# Patient Record
Sex: MALE | Race: OTHER | URBAN - METROPOLITAN AREA
[De-identification: names, ages, dates, MRNs, and addresses within clinical notes are randomized per-mention and may not be internally consistent; named-entity substitution may affect disease eponyms.]

---

## 2020-08-05 ENCOUNTER — EMERGENCY (EMERGENCY)
Facility: HOSPITAL | Age: 35
LOS: 1 days | Discharge: HOME | End: 2020-08-05
Attending: EMERGENCY MEDICINE | Admitting: EMERGENCY MEDICINE
Payer: COMMERCIAL

## 2020-08-05 VITALS
WEIGHT: 210.1 LBS | DIASTOLIC BLOOD PRESSURE: 97 MMHG | TEMPERATURE: 99 F | RESPIRATION RATE: 18 BRPM | HEART RATE: 66 BPM | SYSTOLIC BLOOD PRESSURE: 151 MMHG | OXYGEN SATURATION: 98 %
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
## 2020-08-05 DIAGNOSIS — S51.822A LACERATION WITH FOREIGN BODY OF LEFT FOREARM, INITIAL ENCOUNTER: ICD-10-CM
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
## 2020-08-05 DIAGNOSIS — Z23 ENCOUNTER FOR IMMUNIZATION: ICD-10-CM
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
## 2020-08-05 PROCEDURE — 73090 X-RAY EXAM OF FOREARM: CPT | Mod: 26,LT

## 2020-08-05 PROCEDURE — 99283 EMERGENCY DEPT VISIT LOW MDM: CPT | Mod: 25

## 2020-08-05 PROCEDURE — 90715 TDAP VACCINE 7 YRS/> IM: CPT

## 2020-08-05 PROCEDURE — 99282 EMERGENCY DEPT VISIT SF MDM: CPT

## 2020-08-05 PROCEDURE — 12031 INTMD RPR S/A/T/EXT 2.5 CM/<: CPT

## 2020-08-05 PROCEDURE — 73090 X-RAY EXAM OF FOREARM: CPT

## 2020-08-05 PROCEDURE — 90471 IMMUNIZATION ADMIN: CPT

## 2020-08-05 RX ORDER — CEPHALEXIN 500 MG
1 CAPSULE ORAL
Qty: 21 | Refills: 0
Start: 2020-08-05 | End: 2020-08-11

## 2020-08-05 RX ORDER — TETANUS TOXOID, REDUCED DIPHTHERIA TOXOID AND ACELLULAR PERTUSSIS VACCINE, ADSORBED 5; 2.5; 8; 8; 2.5 [IU]/.5ML; [IU]/.5ML; UG/.5ML; UG/.5ML; UG/.5ML
0.5 SUSPENSION INTRAMUSCULAR ONCE
Refills: 0 | Status: COMPLETED | OUTPATIENT
Start: 2020-08-05 | End: 2020-08-05

## 2020-08-05 RX ADMIN — TETANUS TOXOID, REDUCED DIPHTHERIA TOXOID AND ACELLULAR PERTUSSIS VACCINE, ADSORBED 0.5 MILLILITER(S): 5; 2.5; 8; 8; 2.5 SUSPENSION INTRAMUSCULAR at 09:23

## 2020-08-05 NOTE — ED PROVIDER NOTE - OBJECTIVE STATEMENT
34 y/o M w/ no significant PMHx presents to the ED c/o laceration to L forearm sustained 1 hr pta while at work. Pt reports was hammering steel and piece flew and cut his forearm, initially blood shooting out. Upon arrival bleeding controlled by pressure dressing. Denies numbness/tingling, fever, chills, weakness to extremities or any other sx. Unsure last tetanus. 36 y/o M w/ no significant PMHx presents to the ED c/o laceration to L forearm sustained 1 hr pta while at work. Pt reports was hammering steel and piece flew and cut his forearm, initially blood shooting out. Upon arrival bleeding controlled by pressure dressing. Denies numbness/tingling, weakness to injured extremity or any other sx. Unsure last tetanus.

## 2020-08-05 NOTE — ED PROVIDER NOTE - ATTENDING CONTRIBUTION TO CARE
34 y/o M w/ no significant PMHx presents to the ED c/o laceration to L forearm sustained 1 hr pta while at work. Pt reports was hammering steel and a piece flew and cut his forearm with bleeding. Upon arrival bleeding controlled by pressure dressing. Denies numbness/tingling, weakness to injured extremity or any other sx. Unsure of last tetanus. Pt AAO, NAD, laceration noted to left forearm. foreign body noted on the xray of  left forearm, foreign body is deeper and not visualized by exploration of laceration.  Ortho hand on call consulted re: foreign body. 36 y/o M w/ no significant PMHx presents to the ED c/o laceration to L forearm sustained 1 hr pta while at work. Pt reports was hammering steel and a piece flew and cut his forearm with bleeding. Upon arrival bleeding controlled by pressure dressing. Denies numbness/tingling, weakness to injured extremity or any other sx. Unsure of last tetanus. Pt AAO, NAD, laceration noted to left forearm. foreign body noted on the xray of left forearm, foreign body is deeper and not visualized by exploration of laceration.  Ortho/ hand on call consulted re: foreign body. Per Ortho, pt may follow up outpt for FB removal. Lac sutured by JOLENE Hutchison- see procedure note, Pt stable for dc and outpt f/up.

## 2020-08-05 NOTE — ED PROVIDER NOTE - NSFOLLOWUPINSTRUCTIONS_ED_ALL_ED_FT
Sutured Wound Care  Sutures are stitches that can be used to close wounds. Taking care of your wound properly can help to prevent pain and infection. It can also help your wound to heal more quickly. Follow instructions from your health care provider about how to care for your sutured wound.  Supplies needed:  Soap and water.A clean bandage (dressing), if needed.Antibiotic ointment.A clean towel.How to care for your sutured wound     Keep the wound completely dry for the first 24 hours, or for as long as directed by your health care provider. After 24–48 hours, you may shower or bathe as directed by your health care provider. Do not soak or submerge the wound in water until the sutures have been removed.After the first 24 hours, clean the wound once a day, or as often as directed by your health care provider, using the following steps:  Wash the wound with soap and water.Rinse the wound with water to remove all soap.Pat the wound dry with a clean towel. Do not rub the wound.After cleaning the wound, apply a thin layer of antibiotic ointment as directed by your health care provider. This will prevent infection and keep the dressing from sticking to the wound.Follow instructions from your health care provider about how to change your dressing:  Wash your hands with soap and water. If soap and water are not available, use hand .Change your dressing at least once a day, or as often as told by your health care provider. If your dressing gets wet or dirty, change it.Leave sutures and other skin closures, such as adhesive tape or skin glue, in place. These skin closures may need to stay in place for 2 weeks or longer. If adhesive strip edges start to loosen and curl up, you may trim the loose edges. Do not remove adhesive strips completely unless your health care provider tells you to do that.Check your wound every day for signs of infection. Watch for:  Redness, swelling, or pain.Fluid or blood.Warmth.Pus or a bad smell.Have the sutures removed as directed by your health care provider.Follow these instructions at home:  Medicines     Take or apply over-the-counter and prescription medicines only as told by your health care provider.If you were prescribed an antibiotic medicine or ointment, take or apply it as told by your health care provider. Do not stop using the antibiotic even if your condition improves.General instructions     To help reduce scarring after your wound heals, cover your wound with clothing or apply sunscreen of at least 30 SPF whenever you are outside.Do not scratch or pick at your wound.Avoid stretching your wound.Raise (elevate) the injured area above the level of your heart while you are sitting or lying down, if possible.Drink enough fluids to keep your urine clear or pale yellow.Keep all follow-up visits as told by your health care provider. This is important.Contact a health care provider if:  You received a tetanus shot and you have swelling, severe pain, redness, or bleeding at the injection site.Your wound breaks open.You have redness, swelling, or pain around your wound.You have fluid or blood coming from your wound.Your wound feels warm to the touch.You have a fever.You notice something coming out of your wound, such as wood or glass.You have pain that does not get better with medicine.The skin near your wound changes color.You need to change your dressing very frequently due to a lot of fluid, blood, or pus draining from the wound.You develop a new rash.You develop numbness around the wound.Get help right away if:  You develop severe swelling around your wound.You have pus or a bad smell coming from your wound.Your pain suddenly gets worse and is severe.You develop painful lumps near your wound or anywhere on your body.You have a red streak going away from your wound.The wound is on your hand or foot and:  You cannot properly move a finger or toe.Your fingers or toes look pale or bluish.You have numbness that is spreading down your hand, foot, fingers, or toes.Summary  Sutures are stitches that can be used to close wounds.Taking care of your wound properly can help to prevent pain and infection.Keep the wound completely dry for the first 24 hours, or for as long as directed by your health care provider. After 24–48 hours, you may shower or bathe as directed by your health care provider.This information is not intended to replace advice given to you by your health care provider. Make sure you discuss any questions you have with your health care provider.    F/u for suture removal in 7-10 days. Keep your wound clean, dry., apply topical antibiotics as recommended.

## 2020-08-05 NOTE — ED PROVIDER NOTE - CONSTITUTIONAL, MLM
normal... Well appearing, awake, alert, oriented to person, place, time/situation and in no apparent distress. Well appearing, and in no apparent distress.

## 2020-08-05 NOTE — ED PROVIDER NOTE - NEUROLOGICAL, MLM
Alert and oriented, no focal deficits, no motor or sensory deficits, 5/5 strength to upper extremities, normal sensation

## 2020-08-05 NOTE — ED PROVIDER NOTE - PATIENT PORTAL LINK FT
You can access the FollowMyHealth Patient Portal offered by [facility] by registering at the following website: [email or web address]. By joining [employer]’s FollowMyHealth portal, you will also be able to view your health information using other applications (apps) compatible with our system.

## 2020-08-05 NOTE — ED PROVIDER NOTE - PROGRESS NOTE DETAILS
foreign body noted on the xray of  left forearm , foreign body are deeper and far from  laceration.  Ortho hand on call consulted re: foreign body. pt evaluated by ortho team, d/c home recommended and f/u as out pt for foreign body removal PRN.

## 2020-08-05 NOTE — ED PROVIDER NOTE - MUSCULOSKELETAL, MLM
Spine appears normal, range of motion is not limited, full ROM to L elbow, wrist and fingers, good pulses Spine appears normal, range of motion is not limited, full ROM to L elbow, L wrist and fingers, good pulses

## 2020-08-05 NOTE — ED PROVIDER NOTE - CLINICAL SUMMARY MEDICAL DECISION MAKING FREE TEXT BOX
36 y/o M presents to the ED c/o superficial laceration accidentally sustained while at work. Will check xray to make sure no FB present, will irrigate and close w/ sutures. Will update tetanus. Wound care explained to the pt. 36 y/o M presents to the ED c/o  laceration accidentally sustained while at work. plan : 1) check xray to make sure no FB present, 2) suture to close the wound, 3) tetanus booster, 4) wound care.

## 2020-08-05 NOTE — ED PROVIDER NOTE - SKIN, MLM
Skin normal color for race, warm, dry, no evidence of rash, 1 cm superficial laceration to L forearm w/ no active bleeding on arrival

## 2020-08-05 NOTE — ED PROVIDER NOTE - CARE PROVIDER_API CALL
Shahid Rogers  PLASTIC SURGERY  [address] [place] [address]  Phone: [phone]  Fax: [phone]  Follow Up Time:     Dave Moctezuma  [facility]  [address] [place] [address]  Phone: [phone]  Fax: [phone]  Follow Up Time:

## 2020-08-05 NOTE — CONSULT NOTE ADULT - SUBJECTIVE AND OBJECTIVE BOX
Orthopaedic Surgery Consult Note    For Surgeon: Dr. Valerio     HPI:  The patient is a 35M with no significant past medical history presenting with a laceration to his left forearm s/p injury at work. The patient states that he was hammering steel and that a piece flew in the hair and hit him in the forearm. States he has minimal pain in area overlying the laceration. Denies numbness/tingling of his upper extremities, weakness, any other symptoms.       Allergies:  No Known Allergies    PAST MEDICAL & SURGICAL HISTORY:  Denies     Social History:  Social etoh drinker  no cigarette smoking     Vital Signs Last 24 Hrs  T(C): 37.1 (05 Aug 2020 08:30), Max: 37.1 (05 Aug 2020 08:30)  T(F): 98.7 (05 Aug 2020 08:30), Max: 98.7 (05 Aug 2020 08:30)  HR: 66 (05 Aug 2020 08:30) (66 - 66)  BP: 151/97 (05 Aug 2020 08:30) (151/97 - 151/97)  BP(mean): --  RR: 18 (05 Aug 2020 08:30) (18 - 18)  SpO2: 98% (05 Aug 2020 08:30) (98% - 98%)    Physical Exam:  Gen: NAD, sitting comfortably on chair in the ED   MSK: Skin well perfused. Superficial 1cm laceration noted to palmar aspect of left forearm. No active bleeding/drainage. Radial pulses palpable bilateral upper extremities. Sensation intact to bilateral upper extremities.  strength/biceps/triceps strength 5/5 bilateral upper extremities.     Imaging:   Left forearm xray:  no actue fracture/dislocation. foreign body noted in soft tissue.      A/P: 35yMale with superficial left arm laceration, with foreign body noted on xray  -Discussed with Dr. Valerio  -Per Dr. Valerio, would recommend tetanus shot, antibiotics, irrigation/wound closure and dress with xeroform dressing  -Patient instructed to follow up with Dr. Valerio as an outpatient for removal of foreign body on an elective basis  -Patient understands and agrees with plan  - pain control    Naz Mcnamara PA-C

## 2020-08-05 NOTE — ED PROVIDER NOTE - CARE PROVIDERS DIRECT ADDRESSES
,DirectAddress_Unknown,[email or web address]

## 2020-08-05 NOTE — ED ADULT TRIAGE NOTE - CHIEF COMPLAINT QUOTE
Patient came for left arm laceration from the metal steel on his truck . Tetanus shot not up to date .

## 2020-08-06 PROBLEM — Z00.00 ENCOUNTER FOR PREVENTIVE HEALTH EXAMINATION: Status: ACTIVE | Noted: 2020-08-06

## 2020-08-13 ENCOUNTER — APPOINTMENT (OUTPATIENT)
Dept: ORTHOPEDIC SURGERY | Facility: CLINIC | Age: 35
End: 2020-08-13
Payer: COMMERCIAL

## 2020-08-13 VITALS — WEIGHT: 210 LBS | HEIGHT: 73 IN | RESPIRATION RATE: 16 BRPM | BODY MASS INDEX: 27.83 KG/M2

## 2020-08-13 DIAGNOSIS — Z78.9 OTHER SPECIFIED HEALTH STATUS: ICD-10-CM

## 2020-08-13 DIAGNOSIS — S50.852A SUPERFICIAL FOREIGN BODY OF LEFT FOREARM, INITIAL ENCOUNTER: ICD-10-CM

## 2020-08-13 DIAGNOSIS — S51.812A LACERATION W/OUT FOREIGN BODY OF LEFT FOREARM, INITIAL ENCOUNTER: ICD-10-CM

## 2020-08-13 PROCEDURE — 99204 OFFICE O/P NEW MOD 45 MIN: CPT

## 2021-01-11 ENCOUNTER — EMERGENCY (EMERGENCY)
Facility: HOSPITAL | Age: 36
LOS: 1 days | Discharge: HOME | End: 2021-01-11
Admitting: EMERGENCY MEDICINE
Payer: COMMERCIAL

## 2021-01-11 VITALS
OXYGEN SATURATION: 98 % | HEART RATE: 58 BPM | DIASTOLIC BLOOD PRESSURE: 82 MMHG | RESPIRATION RATE: 16 BRPM | TEMPERATURE: 98 F | SYSTOLIC BLOOD PRESSURE: 128 MMHG

## 2021-01-11 DIAGNOSIS — Z20.822 CONTACT WITH AND (SUSPECTED) EXPOSURE TO COVID-19: ICD-10-CM

## 2021-01-11 PROBLEM — Z78.9 OTHER SPECIFIED HEALTH STATUS: Chronic | Status: ACTIVE | Noted: 2020-08-05

## 2021-01-11 PROCEDURE — 99283 EMERGENCY DEPT VISIT LOW MDM: CPT

## 2021-01-11 NOTE — ED PROVIDER NOTE - NSFOLLOWUPINSTRUCTIONS_ED_ALL_ED_FT
THE COVID 19 TEST RESULTS  - results may take up to 2-3 days to become available   - if you have consented, you will receive your results electronically   -  you can check [employer] [facility] or call [phone] to discuss your results with our nursing staff    Please continue to self quarantine (home isolation) until your result is back and follow instructions accordingly  - positive: complete home isolation for a total of 14 days since day of testing and no more fever with feeling back to baseline   - negative: you will be able to stop home isolation but still practice standard precautions, similar to how you would manage a regular flu/cold.    Return to ER for any worsening symptoms, such as persistent fever >100.4F, shortness of breath, coughing up bloody sputum, chest pain, lethargy, and fainting    Please remember to wash your hands frequently (>20 seconds each time), avoid touching your face, and cover your cough/sneeze.  Always wear a mask when you are outside of your home and practice social distancing.    Only take Tylenol for fever/pain control and avoid NSAIDs (ibuprofen/Advil/Aleve/naproxen) due to potential increased risk of exacerbating COVID-19 infection

## 2021-01-11 NOTE — ED PROVIDER NOTE - OBJECTIVE STATEMENT
36 y/o male presents to the ED requesting COVID-19 testing. Patient is currently asymptomatic and has no other medical complaints at this time. Denies fever, chills, chest pain, SOB, cough. +Possible exposure.

## 2021-01-11 NOTE — ED PROVIDER NOTE - PATIENT PORTAL LINK FT
You can access the FollowMyHealth Patient Portal offered by [facility] by registering at the following website: [email or web address]. By joining [employer]’s FollowMyHealth portal, you will also be able to view your health information using other applications (apps) compatible with our system.